# Patient Record
Sex: MALE | Race: OTHER | Employment: OTHER | URBAN - METROPOLITAN AREA
[De-identification: names, ages, dates, MRNs, and addresses within clinical notes are randomized per-mention and may not be internally consistent; named-entity substitution may affect disease eponyms.]

---

## 2022-01-25 ENCOUNTER — CONSULTATION/EVALUATION (OUTPATIENT)
Dept: URBAN - METROPOLITAN AREA CLINIC 39 | Facility: CLINIC | Age: 72
End: 2022-01-25

## 2022-01-25 DIAGNOSIS — H25.811: ICD-10-CM

## 2022-01-25 DIAGNOSIS — E11.9: ICD-10-CM

## 2022-01-25 DIAGNOSIS — H04.123: ICD-10-CM

## 2022-01-25 DIAGNOSIS — H25.812: ICD-10-CM

## 2022-01-25 DIAGNOSIS — H18.513: ICD-10-CM

## 2022-01-25 PROCEDURE — 92134 CPTRZ OPH DX IMG PST SGM RTA: CPT

## 2022-01-25 PROCEDURE — 92025-3 CORNEAL TOPO, REFUSED

## 2022-01-25 PROCEDURE — 92286 ANT SGM IMG I&R SPECLR MIC: CPT

## 2022-01-25 PROCEDURE — 92136TC INTERFEROMETRY - TECHNICAL COMPONENT

## 2022-01-25 PROCEDURE — 92004 COMPRE OPH EXAM NEW PT 1/>: CPT

## 2022-01-25 PROCEDURE — 92250 FUNDUS PHOTOGRAPHY W/I&R: CPT

## 2022-01-25 PROCEDURE — V2799PMN IMPRIMIS PRED-MOXI-NEPAF 5ML

## 2022-01-25 ASSESSMENT — VISUAL ACUITY
OS_SC: J2
OS_SC: 20/200
OD_CC: J3 PAL
OS_PH: 20/40-1
OS_CC: J3 PAL
OD_PH: 20/30-2
OD_SC: J2

## 2022-01-25 ASSESSMENT — TONOMETRY
OD_IOP_MMHG: 19
OS_IOP_MMHG: 21

## 2022-01-25 NOTE — PATIENT DISCUSSION
The patient has mild cornea guttata, or early Fuchs' endothelial dystrophy. Specular microscopy documented the cell count and morphology. The possibility that the condition could progress was explained. At this early stage observation and serial specular microscopy are indicated.

## 2022-01-25 NOTE — PATIENT DISCUSSION
We discussed at length with the patient the different refractive targets and their advantages and disadvantages. After answering all questions, the patient has decided to target distance vision in both eyes with the understanding that they will lose ALL of their natural near vision and require glasses for all near activities including reading.

## 2022-03-03 ENCOUNTER — PRE-OP/H&P (OUTPATIENT)
Dept: URBAN - METROPOLITAN AREA SURGERY 14 | Facility: SURGERY | Age: 72
End: 2022-03-03

## 2022-03-03 ENCOUNTER — SURGERY/PROCEDURE (OUTPATIENT)
Dept: URBAN - METROPOLITAN AREA CLINIC 39 | Facility: CLINIC | Age: 72
End: 2022-03-03

## 2022-03-03 ENCOUNTER — POST-OP (OUTPATIENT)
Dept: URBAN - METROPOLITAN AREA SURGERY 14 | Facility: SURGERY | Age: 72
End: 2022-03-03

## 2022-03-03 DIAGNOSIS — Z96.1: ICD-10-CM

## 2022-03-03 DIAGNOSIS — H25.811: ICD-10-CM

## 2022-03-03 DIAGNOSIS — H52.213: ICD-10-CM

## 2022-03-03 DIAGNOSIS — H04.123: ICD-10-CM

## 2022-03-03 DIAGNOSIS — H25.812: ICD-10-CM

## 2022-03-03 PROCEDURE — 99211T TECH SERVICE

## 2022-03-03 PROCEDURE — 99211HP H&P OFFICE/OUTPATIENT VISIT, EST

## 2022-03-03 PROCEDURE — 6698454 REMOVE CATARACT;INSERT LENS (SX ONLY)

## 2022-03-03 ASSESSMENT — VISUAL ACUITY: OD_SC: 20/80

## 2022-03-03 ASSESSMENT — TONOMETRY: OD_IOP_MMHG: 10

## 2022-03-10 ENCOUNTER — SURGERY/PROCEDURE (OUTPATIENT)
Dept: URBAN - METROPOLITAN AREA CLINIC 39 | Facility: CLINIC | Age: 72
End: 2022-03-10

## 2022-03-10 ENCOUNTER — PRE-OP/H&P (OUTPATIENT)
Dept: URBAN - METROPOLITAN AREA SURGERY 14 | Facility: SURGERY | Age: 72
End: 2022-03-10

## 2022-03-10 DIAGNOSIS — H25.812: ICD-10-CM

## 2022-03-10 DIAGNOSIS — H21.81: ICD-10-CM

## 2022-03-10 DIAGNOSIS — H04.123: ICD-10-CM

## 2022-03-10 DIAGNOSIS — H52.213: ICD-10-CM

## 2022-03-10 PROCEDURE — 99211HP H&P OFFICE/OUTPATIENT VISIT, EST

## 2022-03-10 PROCEDURE — 66984 XCAPSL CTRC RMVL W/O ECP: CPT

## 2022-03-11 ENCOUNTER — POST-OP (OUTPATIENT)
Dept: URBAN - METROPOLITAN AREA CLINIC 39 | Facility: CLINIC | Age: 72
End: 2022-03-11

## 2022-03-11 DIAGNOSIS — Z96.1: ICD-10-CM

## 2022-03-11 ASSESSMENT — VISUAL ACUITY
OU_SC: 20/50
OS_SC: 20/50
OS_PH: 20/25-1
OD_SC: 20/80+2
OS_SC: J6
OU_SC: J6
OD_SC: J6
OD_PH: 20/30-1

## 2022-03-11 ASSESSMENT — TONOMETRY
OS_IOP_MMHG: 16
OD_IOP_MMHG: 16